# Patient Record
Sex: FEMALE | Race: WHITE | NOT HISPANIC OR LATINO | URBAN - METROPOLITAN AREA
[De-identification: names, ages, dates, MRNs, and addresses within clinical notes are randomized per-mention and may not be internally consistent; named-entity substitution may affect disease eponyms.]

---

## 2024-03-22 ENCOUNTER — INPATIENT (INPATIENT)
Facility: HOSPITAL | Age: 24
LOS: 2 days | Discharge: HOME | DRG: 392 | End: 2024-03-25
Attending: SURGERY | Admitting: SURGERY
Payer: COMMERCIAL

## 2024-03-22 VITALS
DIASTOLIC BLOOD PRESSURE: 75 MMHG | SYSTOLIC BLOOD PRESSURE: 103 MMHG | OXYGEN SATURATION: 98 % | HEART RATE: 81 BPM | TEMPERATURE: 99 F | RESPIRATION RATE: 16 BRPM | WEIGHT: 134.92 LBS

## 2024-03-22 LAB
ALBUMIN SERPL ELPH-MCNC: 4.2 G/DL — SIGNIFICANT CHANGE UP (ref 3.4–5)
ALP SERPL-CCNC: 58 U/L — SIGNIFICANT CHANGE UP (ref 40–120)
ALT FLD-CCNC: 14 U/L — SIGNIFICANT CHANGE UP (ref 12–42)
ANION GAP SERPL CALC-SCNC: 10 MMOL/L — SIGNIFICANT CHANGE UP (ref 9–16)
APPEARANCE UR: CLEAR — SIGNIFICANT CHANGE UP
AST SERPL-CCNC: 19 U/L — SIGNIFICANT CHANGE UP (ref 15–37)
BASOPHILS # BLD AUTO: 0.02 K/UL — SIGNIFICANT CHANGE UP (ref 0–0.2)
BASOPHILS NFR BLD AUTO: 0.2 % — SIGNIFICANT CHANGE UP (ref 0–2)
BILIRUB SERPL-MCNC: 0.8 MG/DL — SIGNIFICANT CHANGE UP (ref 0.2–1.2)
BILIRUB UR-MCNC: NEGATIVE — SIGNIFICANT CHANGE UP
BUN SERPL-MCNC: 9 MG/DL — SIGNIFICANT CHANGE UP (ref 7–23)
CALCIUM SERPL-MCNC: 9.7 MG/DL — SIGNIFICANT CHANGE UP (ref 8.5–10.5)
CHLORIDE SERPL-SCNC: 105 MMOL/L — SIGNIFICANT CHANGE UP (ref 96–108)
CO2 SERPL-SCNC: 26 MMOL/L — SIGNIFICANT CHANGE UP (ref 22–31)
COLOR SPEC: YELLOW — SIGNIFICANT CHANGE UP
CREAT SERPL-MCNC: 1.08 MG/DL — SIGNIFICANT CHANGE UP (ref 0.5–1.3)
DIFF PNL FLD: NEGATIVE — SIGNIFICANT CHANGE UP
EGFR: 74 ML/MIN/1.73M2 — SIGNIFICANT CHANGE UP
EOSINOPHIL # BLD AUTO: 0.01 K/UL — SIGNIFICANT CHANGE UP (ref 0–0.5)
EOSINOPHIL NFR BLD AUTO: 0.1 % — SIGNIFICANT CHANGE UP (ref 0–6)
GLUCOSE SERPL-MCNC: 89 MG/DL — SIGNIFICANT CHANGE UP (ref 70–99)
GLUCOSE UR QL: NEGATIVE MG/DL — SIGNIFICANT CHANGE UP
HCG SERPL-ACNC: <1 MIU/ML — SIGNIFICANT CHANGE UP
HCT VFR BLD CALC: 42.5 % — SIGNIFICANT CHANGE UP (ref 34.5–45)
HGB BLD-MCNC: 13.9 G/DL — SIGNIFICANT CHANGE UP (ref 11.5–15.5)
IMM GRANULOCYTES NFR BLD AUTO: 0.2 % — SIGNIFICANT CHANGE UP (ref 0–0.9)
KETONES UR-MCNC: 40 MG/DL
LACTATE BLDV-MCNC: 0.9 MMOL/L — SIGNIFICANT CHANGE UP (ref 0.5–2)
LEUKOCYTE ESTERASE UR-ACNC: NEGATIVE — SIGNIFICANT CHANGE UP
LIDOCAIN IGE QN: 28 U/L — SIGNIFICANT CHANGE UP (ref 16–77)
LYMPHOCYTES # BLD AUTO: 1.08 K/UL — SIGNIFICANT CHANGE UP (ref 1–3.3)
LYMPHOCYTES # BLD AUTO: 12 % — LOW (ref 13–44)
MCHC RBC-ENTMCNC: 29.7 PG — SIGNIFICANT CHANGE UP (ref 27–34)
MCHC RBC-ENTMCNC: 32.7 GM/DL — SIGNIFICANT CHANGE UP (ref 32–36)
MCV RBC AUTO: 90.8 FL — SIGNIFICANT CHANGE UP (ref 80–100)
MONOCYTES # BLD AUTO: 0.34 K/UL — SIGNIFICANT CHANGE UP (ref 0–0.9)
MONOCYTES NFR BLD AUTO: 3.8 % — SIGNIFICANT CHANGE UP (ref 2–14)
NEUTROPHILS # BLD AUTO: 7.52 K/UL — HIGH (ref 1.8–7.4)
NEUTROPHILS NFR BLD AUTO: 83.7 % — HIGH (ref 43–77)
NITRITE UR-MCNC: NEGATIVE — SIGNIFICANT CHANGE UP
NRBC # BLD: 0 /100 WBCS — SIGNIFICANT CHANGE UP (ref 0–0)
PH UR: 6 — SIGNIFICANT CHANGE UP (ref 5–8)
PLATELET # BLD AUTO: 201 K/UL — SIGNIFICANT CHANGE UP (ref 150–400)
POTASSIUM SERPL-MCNC: 4.1 MMOL/L — SIGNIFICANT CHANGE UP (ref 3.5–5.3)
POTASSIUM SERPL-SCNC: 4.1 MMOL/L — SIGNIFICANT CHANGE UP (ref 3.5–5.3)
PROT SERPL-MCNC: 7.6 G/DL — SIGNIFICANT CHANGE UP (ref 6.4–8.2)
PROT UR-MCNC: NEGATIVE MG/DL — SIGNIFICANT CHANGE UP
RBC # BLD: 4.68 M/UL — SIGNIFICANT CHANGE UP (ref 3.8–5.2)
RBC # FLD: 11.8 % — SIGNIFICANT CHANGE UP (ref 10.3–14.5)
SODIUM SERPL-SCNC: 141 MMOL/L — SIGNIFICANT CHANGE UP (ref 132–145)
SP GR SPEC: 1.02 — SIGNIFICANT CHANGE UP (ref 1–1.03)
TROPONIN I, HIGH SENSITIVITY RESULT: <4 NG/L — SIGNIFICANT CHANGE UP
UROBILINOGEN FLD QL: 0.2 MG/DL — SIGNIFICANT CHANGE UP (ref 0.2–1)
WBC # BLD: 8.99 K/UL — SIGNIFICANT CHANGE UP (ref 3.8–10.5)
WBC # FLD AUTO: 8.99 K/UL — SIGNIFICANT CHANGE UP (ref 3.8–10.5)

## 2024-03-22 PROCEDURE — 74177 CT ABD & PELVIS W/CONTRAST: CPT | Mod: 26,MC

## 2024-03-22 PROCEDURE — 99285 EMERGENCY DEPT VISIT HI MDM: CPT

## 2024-03-22 RX ORDER — SODIUM CHLORIDE 9 MG/ML
1000 INJECTION, SOLUTION INTRAVENOUS
Refills: 0 | Status: DISCONTINUED | OUTPATIENT
Start: 2024-03-22 | End: 2024-03-24

## 2024-03-22 RX ORDER — SODIUM CHLORIDE 9 MG/ML
1000 INJECTION INTRAMUSCULAR; INTRAVENOUS; SUBCUTANEOUS ONCE
Refills: 0 | Status: COMPLETED | OUTPATIENT
Start: 2024-03-22 | End: 2024-03-22

## 2024-03-22 RX ORDER — FAMOTIDINE 10 MG/ML
20 INJECTION INTRAVENOUS ONCE
Refills: 0 | Status: COMPLETED | OUTPATIENT
Start: 2024-03-22 | End: 2024-03-22

## 2024-03-22 RX ORDER — ONDANSETRON 8 MG/1
4 TABLET, FILM COATED ORAL ONCE
Refills: 0 | Status: COMPLETED | OUTPATIENT
Start: 2024-03-22 | End: 2024-03-22

## 2024-03-22 RX ORDER — MORPHINE SULFATE 50 MG/1
4 CAPSULE, EXTENDED RELEASE ORAL ONCE
Refills: 0 | Status: DISCONTINUED | OUTPATIENT
Start: 2024-03-22 | End: 2024-03-22

## 2024-03-22 RX ADMIN — ONDANSETRON 4 MILLIGRAM(S): 8 TABLET, FILM COATED ORAL at 13:06

## 2024-03-22 RX ADMIN — FAMOTIDINE 20 MILLIGRAM(S): 10 INJECTION INTRAVENOUS at 13:05

## 2024-03-22 RX ADMIN — MORPHINE SULFATE 4 MILLIGRAM(S): 50 CAPSULE, EXTENDED RELEASE ORAL at 13:58

## 2024-03-22 RX ADMIN — SODIUM CHLORIDE 1000 MILLILITER(S): 9 INJECTION INTRAMUSCULAR; INTRAVENOUS; SUBCUTANEOUS at 13:04

## 2024-03-22 RX ADMIN — Medication 30 MILLILITER(S): at 13:05

## 2024-03-22 RX ADMIN — SODIUM CHLORIDE 100 MILLILITER(S): 9 INJECTION, SOLUTION INTRAVENOUS at 21:01

## 2024-03-22 NOTE — H&P ADULT - NSHPLABSRESULTS_GEN_ALL_CORE
CBC Full  -  ( 22 Mar 2024 12:32 )  WBC Count : 8.99 K/uL  RBC Count : 4.68 M/uL  Hemoglobin : 13.9 g/dL  Hematocrit : 42.5 %  Platelet Count - Automated : 201 K/uL  Mean Cell Volume : 90.8 fl  Mean Cell Hemoglobin : 29.7 pg  Mean Cell Hemoglobin Concentration : 32.7 gm/dL  Auto Neutrophil # : 7.52 K/uL  Auto Lymphocyte # : 1.08 K/uL  Auto Monocyte # : 0.34 K/uL  Auto Eosinophil # : 0.01 K/uL  Auto Basophil # : 0.02 K/uL  Auto Neutrophil % : 83.7 %  Auto Lymphocyte % : 12.0 %  Auto Monocyte % : 3.8 %  Auto Eosinophil % : 0.1 %  Auto Basophil % : 0.2 %      141  |  105  |  9   ----------------------------<  89  4.1   |  26  |  1.08  Ca    9.7      22 Mar 2024 12:32  TPro  7.6  /  Alb  4.2  /  TBili  0.8  /  DBili  x   /  AST  19  /  ALT  14  /  AlkPhos  58  03-22  LIVER FUNCTIONS - ( 22 Mar 2024 12:32 )  Alb: 4.2 g/dL / Pro: 7.6 g/dL / ALK PHOS: 58 U/L / ALT: 14 U/L / AST: 19 U/L / GGT: x         CAPILLARY BLOOD GLUCOSE    Urinalysis Basic - ( 22 Mar 2024 12:32 )  Color: Yellow / Appearance: Clear / S.024 / pH: x  Gluc: 89 mg/dL / Ketone: 40 mg/dL  / Bili: Negative / Urobili: 0.2 mg/dL   Blood: x / Protein: Negative mg/dL / Nitrite: Negative   Leuk Esterase: Negative / RBC: x / WBC x   Sq Epi: x / Non Sq Epi: x / Bacteria: x    PROCEDURE:  CT of the Abdomen and Pelvis was performed.  Sagittal and coronal reformats were performed.  FINDINGS:  LOWER CHEST: Within normal limits.  LIVER: Within normal limits.  BILE DUCTS: Normal caliber.  GALLBLADDER: Within normal limits.  SPLEEN: Within normal limits.  PANCREAS: Within normal limits.  ADRENALS: Within normal limits.  KIDNEYS/URETERS: Within normal limits.  BLADDER: Within normal limits.  REPRODUCTIVE ORGANS: Normal size anteverted uterus containing an IUD in   relation to the endometrial canal of fundus and body. Unremarkable right   ovary. 2.6 x 3.4 cm left ovarian cyst/dominant follicle.  BOWEL: Evaluation of bowel is limited due to lack of oral contrast   material. Colon loaded with fecal material. Colon is not distended.   Normal appendix. Stomach is not distended. Moderately dilated loops of   mid to distal ileum with diameters measuring up to 4.3 cm. The distal   ileal loops are fecalized . There appear to be discontinuous segments of   collapsed distal small bowel present. There is a 14 cm long segment of   mild wall thickening in the midpelvis. One single transition point is not   definitively identified. Normal enhancement of the bowel wall. No   pneumatosis. No mesenteric or portal venous air. No mesenteric edema.  PERITONEUM: Trace ascites.  VESSELS: Within normal limits.  RETROPERITONEUM/LYMPH NODES: No lymphadenopathy.  ABDOMINAL WALL: Within normal limits.  BONES: Within normal limits.    IMPRESSION:  Normal appendix.  Dilated distal small bowel with fecalization, suggestive of small bowel   obstruction.. 1 single transition point cannot be identified. Consider   chronic inflammatory ileitis/Crohn's or other causes of ileal stenosis in   the differential diagnosis.  Trace ascites.    --- End of Report ---

## 2024-03-22 NOTE — ED PROVIDER NOTE - PHYSICAL EXAMINATION
VITAL SIGNS: I have reviewed nursing notes and confirm.  CONSTITUTIONAL: Well-developed; well-nourished; in no acute distress.  SKIN: Skin is warm and dry, no acute rash.  HEAD: Normocephalic; atraumatic.  NECK: Supple; non tender.  CARD: S1, S2 normal; no murmurs, gallops, or rubs. Regular rate and rhythm.  RESP: No wheezes, rales or rhonchi.  ABD: Soft; +ttp along the epogastric and LUE region; mild periumbilical ttp; no rebound or guarding.  EXT: Normal ROM. No clubbing, cyanosis or edema.  NEURO: Alert, oriented. Grossly unremarkable. [place], normal tone, no gross motor or sensory changes. Fluent speech.   PSYCH: Cooperative, appropriate. Mood and affect wnl. VITAL SIGNS: I have reviewed nursing notes and confirm.  CONSTITUTIONAL: Well-developed; well-nourished; in no acute distress.  SKIN: Skin is warm and dry, no acute rash.  HEAD: Normocephalic; atraumatic.  NECK: Supple; non tender.  CARD: S1, S2 normal; no murmurs, gallops, or rubs. Regular rate and rhythm.  RESP: No wheezes, rales or rhonchi.  ABD: Soft; +ttp along the epigastric and LUE region; mild periumbilical ttp; no rebound or guarding.  EXT: Normal ROM. No clubbing, cyanosis or edema.  NEURO: Alert, oriented. Grossly unremarkable. [place], normal tone, no gross motor or sensory changes. Fluent speech.   PSYCH: Cooperative, appropriate. Mood and affect wnl.

## 2024-03-22 NOTE — PATIENT PROFILE ADULT - FALL HARM RISK - UNIVERSAL INTERVENTIONS
Six month check up for history of endometrial cancer. Pt states no abnormal spotting, bleeding or pain. 1. Have you been to the ER, urgent care clinic since your last visit? Hospitalized since your last visit?no  2. Have you seen or consulted any other health care providers outside of the [address] [place] [address] since your last visit? Include any pap smears or colon screening.  no Bed in lowest position, wheels locked, appropriate side rails in place/Call bell, personal items and telephone in reach/Instruct patient to call for assistance before getting out of bed or chair/Non-slip footwear when patient is out of bed/[place] to call system/Physically safe environment - no spills, clutter or unnecessary equipment/Purposeful Proactive Rounding/Room/bathroom lighting operational, light cord in reach

## 2024-03-22 NOTE — ED PROVIDER NOTE - NS ED ROS FT
+abdominal pain, nausea  Denies fevers, chills, vomiting, diarrhea, constipation, urinary symptoms, chest pain, palpitations, shortness of breath, dyspnea on exertion, syncope/near syncope, cough/URI symptoms, headache, weakness, numbness, focal deficits, visual changes, gait or balance changes, dizziness

## 2024-03-22 NOTE — CHART NOTE - NSCHARTNOTEFT_GEN_A_CORE
22yo F, PMx iatrogenic esophagitis 2/2 doxycycline, insig PSx who presented to ER w intermittent episodes epigastric abd pain since early AM. Endorses nausea w/o emesis, ROS otherwise negative. +BF. Afebrile, HD w/nl. Abd soft, ND, TTP in epigastrium w/o R/G. WBC 8.99 w PMN left shift, otherwise unremarkable. CT A/P w IV contrast notable for dilated loops of fecalized small bowel w discontinuous segments of collapsed small bowel, w 14cm segment of ileal thickening in midpelvis, extensive stool burden in colon. DDx broad, gastroenteritis vs IBD (Crohns>UC), mechanical obstruction less likely given normal BF and loaded colon. Will admit, NPO, JUSTIN, observe off abx and anti-emetics. GI consult in AM. Will likely need colonoscopy to evaluate for Crohns. Discussed w attending surgeon on call.

## 2024-03-22 NOTE — ED ADULT NURSE REASSESSMENT NOTE - NS ED NURSE REASSESS COMMENT FT1
Patients reports relief from pain, currently resting comfortably on recliner. Regular respiratory rate and rhythm noted.

## 2024-03-22 NOTE — PATIENT PROFILE ADULT - TOBACCO USE
Future appt:    Last Appointment with provider:   7/29/2020; No f/u recommended    Last appointment at [facility]:  7/29/2020  Cholesterol, Total (mg/dL)   Date Value   03/19/2019 207 (H)     HDL Cholesterol (mg/dL)   Date Value   03/19/2019 40     LDL C
Never smoker

## 2024-03-22 NOTE — H&P ADULT - HISTORY OF PRESENT ILLNESS
23y F with PMHx of esophagitis (diagnosed on EGD in 2020) and episodic bloody BMs (no medical workup) and no PSHx presented to [facility] with 1d abdominal pain and nausea. Patient's last flatus was yesterday and last BM this morning. Patient describes the abdominal pain as episodic, sharp, and wave like across the upper abdomen. On presentation, patient was afebrile and hemodynamically stable. CBC, CMP, and UA unremarkable. CT A/P with findings of dilated distal small bowel with fecalization, suggestive of small bowel obstruction (with no noticable transition point) vs chronic inflammatory ileitis/Crohn's or other causes of ileal stenosis. Patient was admitted to [facility] surgery for further workup of possible SBO vs IBD.     PMHx: esophagitis  PSHx: None  Allergies: NKDA  Meds: none  Social: occasional alcohol and marijuana use  FHx: Breast cancer (mother), Hodgkins lymphoms (father)  Last C-scope: Denies  Last EGD: 2020 (esophagitis)

## 2024-03-22 NOTE — ED ADULT NURSE NOTE - EXTENSIONS OF SELF_ADULT
None Valtrex Pregnancy And Lactation Text: this medication is Pregnancy Category B and is considered safe during pregnancy. This medication is not directly found in breast milk but it's metabolite acyclovir is present.

## 2024-03-22 NOTE — ED ADULT NURSE NOTE - OBJECTIVE STATEMENT
Patient reports sharp upper ab pain that woke her at 4am. Reports nausea. Reports regular BM, but not passing gas. Denied diarrhea, vomiting, fevers,chills.

## 2024-03-22 NOTE — ED ADULT NURSE NOTE - NSFALLUNIVINTERV_ED_ALL_ED
Bed/Stretcher in lowest position, wheels locked, appropriate side rails in place/Call bell, personal items and telephone in reach/Instruct patient to call for assistance before getting out of bed/chair/stretcher/Non-slip footwear applied when patient is off stretcher/[place] to call system/Physically safe environment - no spills, clutter or unnecessary equipment/Purposeful proactive rounding/Room/bathroom lighting operational, light cord in reach

## 2024-03-22 NOTE — ED PROVIDER NOTE - PROGRESS NOTE DETAILS
Patient states her pain has not resolved or improved with a GI cocktail.  Will plan to add morphine for pain relief and will obtain CT abdomen and pelvis to evaluate for any pathology.  Patient is stable at this time.

## 2024-03-22 NOTE — H&P ADULT - ASSESSMENT
23y F with PMHx of esophagitis (diagnosed on EGD in 2020) and episodic bloody BMs (no medical workup) and no PSHx presented to [facility] with 1d abdominal pain and nausea. CT A/P findings concerning for SBO vs inflammatory ileitis vs Crohn's. Clinical exam and continued bowel function makes SBO less likely diagnosis. Patient admitted to [facility] fur further workup.    NPO/IVF  Pain and nausea prn  SCDs/OOB  AM labs  GI consult

## 2024-03-22 NOTE — ED PROVIDER NOTE - OBJECTIVE STATEMENT
23-year-old female, denies past medical history, presenting to the emergency department complaining of upper abdominal pain since 4 AM this morning.  Patient describes waves of pain that have been increasing through the day.  She reports taking a Tylenol with some mild relief, but then states the pain returned.  She also tried taking 2 Tums without improvement.  Patient reports the last meal she had was dinner around 7 PM; which consisted of 3 potatoes and carrots.  She denies vomiting, headache, back pain, chest pain or shortness of breath.  Patient does endorse mild nausea.  She states the pain radiates towards above her umbilicus, left lower quadrant, as well as towards her sternum.

## 2024-03-22 NOTE — ED PROVIDER NOTE - CLINICAL SUMMARY MEDICAL DECISION MAKING FREE TEXT BOX
23-year-old female, denies past medical history, presenting to the emergency department complaining of upper abdominal pain since 4 AM this morning.  Patient found to have tenderness to palpation in the epigastric region.  No other red flags noted on exam and patient has stable vitals in triage.  Plan to give GI cocktail and obtain medical labs.  Will also give IV fluids.  Will reassess and dispo pending medical workup and clinical improvement. 23-year-old female, denies past medical history, presenting to the emergency department complaining of upper abdominal pain since 4 AM this morning.  Patient found to have tenderness to palpation in the epigastric region and periumbilical region.  No other red flags noted on exam and patient has stable vitals in triage.  Plan to give GI cocktail and obtain medical labs.  Will also give IV fluids.  Will reassess and dispo pending medical workup and clinical improvement.

## 2024-03-22 NOTE — H&P ADULT - NSICDXFAMILYHX_GEN_ALL_CORE_FT
FAMILY HISTORY:  Father  Still living? Unknown  Family history of Hodgkin's lymphoma, Age at diagnosis: Age Unknown    Mother  Still living? Unknown  FH: breast cancer, Age at diagnosis: Age Unknown

## 2024-03-23 LAB
ANION GAP SERPL CALC-SCNC: 10 MMOL/L — SIGNIFICANT CHANGE UP (ref 5–17)
APTT BLD: 30.4 SEC — SIGNIFICANT CHANGE UP (ref 24.5–35.6)
BASOPHILS # BLD AUTO: 0.02 K/UL — SIGNIFICANT CHANGE UP (ref 0–0.2)
BASOPHILS NFR BLD AUTO: 0.3 % — SIGNIFICANT CHANGE UP (ref 0–2)
BLD GP AB SCN SERPL QL: NEGATIVE — SIGNIFICANT CHANGE UP
BUN SERPL-MCNC: 12 MG/DL — SIGNIFICANT CHANGE UP (ref 7–23)
CALCIUM SERPL-MCNC: 9 MG/DL — SIGNIFICANT CHANGE UP (ref 8.4–10.5)
CHLORIDE SERPL-SCNC: 106 MMOL/L — SIGNIFICANT CHANGE UP (ref 96–108)
CO2 SERPL-SCNC: 22 MMOL/L — SIGNIFICANT CHANGE UP (ref 22–31)
CREAT SERPL-MCNC: 0.88 MG/DL — SIGNIFICANT CHANGE UP (ref 0.5–1.3)
CRP SERPL-MCNC: 5.7 MG/L — HIGH (ref 0–4)
CULTURE RESULTS: SIGNIFICANT CHANGE UP
EGFR: 95 ML/MIN/1.73M2 — SIGNIFICANT CHANGE UP
EOSINOPHIL # BLD AUTO: 0.11 K/UL — SIGNIFICANT CHANGE UP (ref 0–0.5)
EOSINOPHIL NFR BLD AUTO: 1.8 % — SIGNIFICANT CHANGE UP (ref 0–6)
ERYTHROCYTE [SEDIMENTATION RATE] IN BLOOD: 5 MM/HR — SIGNIFICANT CHANGE UP
GI PCR PANEL: SIGNIFICANT CHANGE UP
GLUCOSE SERPL-MCNC: 60 MG/DL — LOW (ref 70–99)
HCT VFR BLD CALC: 35.4 % — SIGNIFICANT CHANGE UP (ref 34.5–45)
HGB BLD-MCNC: 11.6 G/DL — SIGNIFICANT CHANGE UP (ref 11.5–15.5)
IMM GRANULOCYTES NFR BLD AUTO: 0.2 % — SIGNIFICANT CHANGE UP (ref 0–0.9)
INR BLD: 1.02 — SIGNIFICANT CHANGE UP (ref 0.85–1.18)
LYMPHOCYTES # BLD AUTO: 1.6 K/UL — SIGNIFICANT CHANGE UP (ref 1–3.3)
LYMPHOCYTES # BLD AUTO: 25.9 % — SIGNIFICANT CHANGE UP (ref 13–44)
MAGNESIUM SERPL-MCNC: 1.9 MG/DL — SIGNIFICANT CHANGE UP (ref 1.6–2.6)
MCHC RBC-ENTMCNC: 31 PG — SIGNIFICANT CHANGE UP (ref 27–34)
MCHC RBC-ENTMCNC: 32.8 GM/DL — SIGNIFICANT CHANGE UP (ref 32–36)
MCV RBC AUTO: 94.7 FL — SIGNIFICANT CHANGE UP (ref 80–100)
MONOCYTES # BLD AUTO: 0.37 K/UL — SIGNIFICANT CHANGE UP (ref 0–0.9)
MONOCYTES NFR BLD AUTO: 6 % — SIGNIFICANT CHANGE UP (ref 2–14)
NEUTROPHILS # BLD AUTO: 4.06 K/UL — SIGNIFICANT CHANGE UP (ref 1.8–7.4)
NEUTROPHILS NFR BLD AUTO: 65.8 % — SIGNIFICANT CHANGE UP (ref 43–77)
NRBC # BLD: 0 /100 WBCS — SIGNIFICANT CHANGE UP (ref 0–0)
PHOSPHATE SERPL-MCNC: 3.6 MG/DL — SIGNIFICANT CHANGE UP (ref 2.5–4.5)
PLATELET # BLD AUTO: 143 K/UL — LOW (ref 150–400)
POTASSIUM SERPL-MCNC: 4.4 MMOL/L — SIGNIFICANT CHANGE UP (ref 3.5–5.3)
POTASSIUM SERPL-SCNC: 4.4 MMOL/L — SIGNIFICANT CHANGE UP (ref 3.5–5.3)
PROTHROM AB SERPL-ACNC: 11.6 SEC — SIGNIFICANT CHANGE UP (ref 9.5–13)
RBC # BLD: 3.74 M/UL — LOW (ref 3.8–5.2)
RBC # FLD: 11.8 % — SIGNIFICANT CHANGE UP (ref 10.3–14.5)
RH IG SCN BLD-IMP: POSITIVE — SIGNIFICANT CHANGE UP
SODIUM SERPL-SCNC: 138 MMOL/L — SIGNIFICANT CHANGE UP (ref 135–145)
SPECIMEN SOURCE: SIGNIFICANT CHANGE UP
WBC # BLD: 6.17 K/UL — SIGNIFICANT CHANGE UP (ref 3.8–10.5)
WBC # FLD AUTO: 6.17 K/UL — SIGNIFICANT CHANGE UP (ref 3.8–10.5)

## 2024-03-23 PROCEDURE — 99254 IP/OBS CNSLTJ NEW/EST MOD 60: CPT | Mod: GC

## 2024-03-23 PROCEDURE — 99254 IP/OBS CNSLTJ NEW/EST MOD 60: CPT

## 2024-03-23 RX ORDER — ACETAMINOPHEN 500 MG
1000 TABLET ORAL ONCE
Refills: 0 | Status: COMPLETED | OUTPATIENT
Start: 2024-03-23 | End: 2024-03-23

## 2024-03-23 RX ORDER — ONDANSETRON 8 MG/1
4 TABLET, FILM COATED ORAL ONCE
Refills: 0 | Status: COMPLETED | OUTPATIENT
Start: 2024-03-23 | End: 2024-03-23

## 2024-03-23 RX ORDER — MAGNESIUM SULFATE 500 MG/ML
1 VIAL (ML) INJECTION ONCE
Refills: 0 | Status: COMPLETED | OUTPATIENT
Start: 2024-03-23 | End: 2024-03-23

## 2024-03-23 RX ORDER — SOD SULF/SODIUM/NAHCO3/KCL/PEG
2000 SOLUTION, RECONSTITUTED, ORAL ORAL ONCE
Refills: 0 | Status: COMPLETED | OUTPATIENT
Start: 2024-03-23 | End: 2024-03-23

## 2024-03-23 RX ORDER — ACETAMINOPHEN 500 MG
650 TABLET ORAL EVERY 6 HOURS
Refills: 0 | Status: DISCONTINUED | OUTPATIENT
Start: 2024-03-23 | End: 2024-03-24

## 2024-03-23 RX ORDER — LANOLIN ALCOHOL/MO/W.PET/CERES
5 CREAM (GRAM) TOPICAL AT BEDTIME
Refills: 0 | Status: DISCONTINUED | OUTPATIENT
Start: 2024-03-23 | End: 2024-03-25

## 2024-03-23 RX ORDER — ONDANSETRON 8 MG/1
4 TABLET, FILM COATED ORAL EVERY 4 HOURS
Refills: 0 | Status: DISCONTINUED | OUTPATIENT
Start: 2024-03-23 | End: 2024-03-25

## 2024-03-23 RX ORDER — ONDANSETRON 8 MG/1
4 TABLET, FILM COATED ORAL EVERY 4 HOURS
Refills: 0 | Status: DISCONTINUED | OUTPATIENT
Start: 2024-03-23 | End: 2024-03-23

## 2024-03-23 RX ADMIN — Medication 400 MILLIGRAM(S): at 14:23

## 2024-03-23 RX ADMIN — Medication 650 MILLIGRAM(S): at 21:15

## 2024-03-23 RX ADMIN — Medication 2000 MILLILITER(S): at 17:35

## 2024-03-23 RX ADMIN — Medication 1000 MILLIGRAM(S): at 07:34

## 2024-03-23 RX ADMIN — Medication 400 MILLIGRAM(S): at 07:04

## 2024-03-23 RX ADMIN — ONDANSETRON 4 MILLIGRAM(S): 8 TABLET, FILM COATED ORAL at 07:55

## 2024-03-23 RX ADMIN — Medication 650 MILLIGRAM(S): at 20:52

## 2024-03-23 RX ADMIN — Medication 100 GRAM(S): at 12:17

## 2024-03-23 RX ADMIN — SODIUM CHLORIDE 100 MILLILITER(S): 9 INJECTION, SOLUTION INTRAVENOUS at 12:17

## 2024-03-23 NOTE — CONSULT NOTE ADULT - ASSESSMENT
23y F with PMHx of esophagitis (diagnosed on EGD in 2020) and episodic bloody BMs (no medical workup) and no PSHx presented to [facility] with 1d abdominal pain and nausea in the setting of  small bowel obstruction vs chronic inflammatory ileitis/Crohn's or other causes of ileal stenosis. Patient was admitted to [facility] surgery for further workup of possible SBO vs IBD.     Plan  #r/o SBO  -ab pain significantly improved  -GI consulted, possible c-scope Monday  -f/u GI pcr, fecal calprotectin, HBV, quantiferon  -CLD, advance as tolerated  -mgmt as per primary    #Thrombocytopenia  -no h/o bleeding d/o; likely dilutional  -c/w CBC qd    #DVT ppx  -SCDs

## 2024-03-23 NOTE — CONSULT NOTE ADULT - SUBJECTIVE AND OBJECTIVE BOX
Patient seen and evaluated at bedside    HPI:  23y F with PMHx of esophagitis (diagnosed on EGD in 2020) and episodic bloody BMs (no medical workup) and no PSHx presented to [facility] with 1d abdominal pain and nausea in the setting of  small bowel obstruction vs chronic inflammatory ileitis/Crohn's or other causes of ileal stenosis. Patient was admitted to [facility] surgery for further workup of possible SBO vs IBD.     PMHx:   History of esophagitis      PSHx:   No significant past surgical history      FAMILY HISTORY:  Family history of Hodgkin's lymphoma (Father)    FH: breast cancer (Mother)      Allergies:  penicillins (Unknown)  clindamycin (Hives; Urticaria)  sulfa drugs (Unknown)    Home Medications:    Current Medications:   lactated ringers. 1000 milliLiter(s) IV Continuous <Continuous>  polyethylene glycol/electrolyte Solution. 2000 milliLiter(s) Oral once    Social History  Smoking History: denies  Alcohol Use: occasional  Drug Use: occasional (marijuana)    REVIEW OF SYSTEMS:  Constitutional:     [X] negative [ ] fevers [ ] chills [ ] weight loss [ ] weight gain  HEENT:                  [X] negative [ ] dry eyes [ ] eye irritation [ ] postnasal drip [ ] nasal congestion  CV:                         [X] negative  [ ] chest pain [ ] orthopnea [ ] palpitations [ ] murmur  Resp:                     [X] negative [ ] cough [ ] shortness of breath [ ] wheezing [ ] sputum [ ] hemoptysis  GI:                          [X] negative [ ] nausea [ ] vomiting [ ] diarrhea [ ] constipation [ ] abd pain [ ] dysphagia   :                        [X] negative [ ] dysuria [ ] nocturia [ ] hematuria [ ] increased urinary frequency  MSK:                      [X] negative [ ] back pain [ ] myalgias [ ] arthralgias [ ] fracture  Skin:                       [X] negative [ ] rash [ ] itch  Neuro:                   [X] negative [ ] headache [ ] dizziness [ ] syncope [ ] weakness [ ] numbness  Psych:                    [X] negative [ ] anxiety [ ] depression  Endo:                     [X] negative [ ] diabetes [ ] thyroid problem  Heme/Lymph:      [X] negative [ ] anemia [ ] bleeding problem  Allergic/Immune: [X] negative [ ] itchy eyes [ ] nasal discharge [ ] hives [ ] angioedema    [X] All other systems negative or otherwise described above.  [ ] Unable to assess ROS due to ________.    ICU Vital Signs Last 24 Hrs  T(C): 36.8 (23 Mar 2024 13:24), Max: 37 (22 Mar 2024 17:23)  T(F): 98.3 (23 Mar 2024 13:24), Max: 98.6 (22 Mar 2024 17:23)  HR: 80 (23 Mar 2024 13:24) (67 - 83)  BP: 109/76 (23 Mar 2024 13:24) (95/58 - 109/76)  BP(mean): 71 (23 Mar 2024 09:34) (71 - 71)  ABP: --  ABP(mean): --  RR: 18 (23 Mar 2024 13:24) (16 - 18)  SpO2: 98% (23 Mar 2024 13:24) (98% - 100%)    O2 Parameters below as of 23 Mar 2024 13:24  Patient On (Oxygen Delivery Method): room air          Orthostatic VS    Daily     Daily   I&O's Summary    22 Mar 2024 07:01  -  23 Mar 2024 07:00  --------------------------------------------------------  IN: 500 mL / OUT: 300 mL / NET: 200 mL    23 Mar 2024 07:01  -  23 Mar 2024 14:40  --------------------------------------------------------  IN: 300 mL / OUT: 600 mL / NET: -300 mL        Physical Exam:  GENERAL: No acute distress, well-developed  ENT: EOMI, conjunctiva and sclera clear, Neck supple, No JVD, moist mucosa  CHEST/LUNG: Clear to auscultation bilaterally; No wheeze, equal breath sounds bilaterally  HEART: Regular rate and rhythm; No murmurs, rubs, or gallops, radial and DP 2+ b/l, euvolemic  ABDOMEN: Soft, Nontender, Nondistended  EXTREMITIES:  No clubbing, cyanosis, or edema  NEUROLOGY: AAOx3, non-focal  SKIN: Normal color, No rashes or lesions    LABS:                        11.6   6.17  )-----------( 143      ( 23 Mar 2024 05:30 )             35.4     PT/INR - ( 23 Mar 2024 05:30 )   PT: 11.6 sec;   INR: 1.02          PTT - ( 23 Mar 2024 05:30 )  PTT:30.4 sec  03-23    138  |  106  |  12  ----------------------------<  60<L>  4.4   |  22  |  0.88    Ca    9.0      23 Mar 2024 05:30  Phos  3.6     03-23  Mg     1.9     03-23    TPro  7.6  /  Alb  4.2  /  TBili  0.8  /  DBili  x   /  AST  19  /  ALT  14  /  AlkPhos  58  03-22      Urinalysis Basic - ( 23 Mar 2024 05:30 )    Color: x / Appearance: x / SG: x / pH: x  Gluc: 60 mg/dL / Ketone: x  / Bili: x / Urobili: x   Blood: x / Protein: x / Nitrite: x   Leuk Esterase: x / RBC: x / WBC x   Sq Epi: x / Non Sq Epi: x / Bacteria: x          RADIOLOGY & ADDITIONAL STUDIES:    Reviewed
HPI:  23F h/o pill esophagitis (2020) p/w abd pain and nausea x 1d, found with ?TI stricture.    Pt was in her USOH until being woken up with episodic band-like epigastric abd pain yesterday AM which lasted ~10 hrs until she received pain meds in ED. At baseline pt reports daily non-bloody formed BM and had a normal-appearing BM yesterday at 10AM without improvement in pain. +Nausea but no fevers, chills, vomiting, vision changes, oral ulcers, rashes, joint pains, recent GI infections, or FH of IBD/autoimmune conditions. In the past had GI issues x 1 yr with Accutane (resolved in 2022), lower abd cramping thought 2/2 ovarian cyst, and previously had minimal BRBPR on toilet paper while wiping. No prior colonoscopy.    At [facility], labs notable for  and CRP 5.7, and CT A/P shows ileal dilation and fecalization w/o transition point, no significant stranding on CT, and stool in R colon. Currently she continues to feel overall improved and +passing gas.    Allergies  penicillins (Unknown)  clindamycin (Hives; Urticaria)  sulfa drugs (Unknown)    Intolerances      Home Medications:    MEDICATIONS:  MEDICATIONS  (STANDING):  lactated ringers. 1000 milliLiter(s) (100 mL/Hr) IV Continuous <Continuous>  magnesium sulfate  IVPB 1 Gram(s) IV Intermittent once    MEDICATIONS  (PRN):    PAST MEDICAL & SURGICAL HISTORY:  History of esophagitis      No significant past surgical history      Vital Signs Last 24 Hrs  T(C): 36.8 (23 Mar 2024 04:36), Max: 37 (22 Mar 2024 12:06)  T(F): 98.2 (23 Mar 2024 04:36), Max: 98.6 (22 Mar 2024 12:06)  HR: 83 (23 Mar 2024 04:36) (67 - 83)  BP: 105/72 (23 Mar 2024 04:36) (101/65 - 108/69)  BP(mean): --  RR: 18 (23 Mar 2024 04:36) (16 - 18)  SpO2: 100% (23 Mar 2024 04:36) (98% - 100%)    Parameters below as of 23 Mar 2024 04:36  Patient On (Oxygen Delivery Method): room air      03-22 @ 07:01  -  03-23 @ 07:00  --------------------------------------------------------  IN: 500 mL / OUT: 300 mL / NET: 200 mL      PHYSICAL EXAM:  General: Alert, no acute distress  Lungs: Normal respiratory effort  Abdomen: Soft, nondistended, mild tenderness midline epigastrically and periumbilically  Extremities: No edema  Neurological: Moving all extremities spontaneously    LABS:                        11.6   6.17  )-----------( 143      ( 23 Mar 2024 05:30 )             35.4     03-23    138  |  106  |  12  ----------------------------<  60<L>  4.4   |  22  |  0.88    Ca    9.0      23 Mar 2024 05:30  Phos  3.6     03-23  Mg     1.9     03-23    TPro  7.6  /  Alb  4.2  /  TBili  0.8  /  DBili  x   /  AST  19  /  ALT  14  /  AlkPhos  58  03-22    PT/INR - ( 23 Mar 2024 05:30 )   PT: 11.6 sec;   INR: 1.02     PTT - ( 23 Mar 2024 05:30 )  PTT:30.4 sec    RADIOLOGY & ADDITIONAL STUDIES:  Reviewed.

## 2024-03-23 NOTE — CONSULT NOTE ADULT - ASSESSMENT
23F h/o pill esophagitis (2020) p/w abd pain and nausea x 1d, found with ?TI stricture.    Unclear cause CT findings -- strictured TI from Crohns is possible but history not suggestive of prior IBD and no FH of autoimmune conditions. No significant evidence of inflammation.    Recommendations:  - F/u GI PCR, fecal calprotectin  - Check HBV, Quantiferon  - Advance diet to clear liquids, and if tolerating would start slow bowel prep with Golytely today for tentative colonoscopy Monday  - If pt sx resolve and prefers outpatient colo, pt should be seen at [address] [place] [address], [address], [place] [address] (phone: [phone])    We will continue to follow. Please see attending addendum for final recommendations.     Kenyon (Elizabeth Bah MD  Gastroenterology Fellow  Pager (M-F 7a-7z): [phone]  Pager (after hours): Please call  for on-call fellow   23F h/o pill esophagitis (2020) p/w abd pain and nausea x 1d, found with ?TI stricture.    Unclear cause CT findings -- strictured TI from Crohns is possible but history not suggestive of prior IBD and no FH of autoimmune conditions. No significant evidence of inflammation. Discussed with patient regarding colonoscopy for evaluation and she prefers to get outpatient evaluation. If she is tolerating her diet this would be OK from GI standpoint. She will talk to her father (who is a physician) regarding which GI to see.    Recommendations:  - F/u GI PCR, fecal calprotectin  - Check HBV, Quantiferon  - Advance diet to clear liquids, then to low-residue diet.   - Pt can be booked for direct colonoscopy if she would like to follow up with [facility] -- please let us know and we can make arrangements  - If pt decides on inpatient colonoscopy, would keep on clears and start slow bowel prep with Gol[facility] today for tentative colonoscopy Monday    We will continue to follow. Please see attending addendum for final recommendations.     Kenyon (Lexi) MD [place]  Gastroenterology Fellow  Pager (M-F 7a-5p): [phone]  Pager (after hours): Please call  for on-call fellow

## 2024-03-23 NOTE — CONSULT NOTE ADULT - ATTENDING COMMENTS
Patient seen and discussed with fellow plan as noted above  Presented with abdominal pain, found to have likely inflammatory TI stricture on cross sectional imaging. Needs colonoscopy (can be outpatient) as well as MRE, low residue diet.

## 2024-03-23 NOTE — PROGRESS NOTE ADULT - SUBJECTIVE AND OBJECTIVE BOX
O/N: admitted, GI consulted O/N: admitted, GI consulted    SUBJECTIVE: Patient seen and examined bedside by Surgical resident. States that the pain is ok this am, better than when she showed up, +n/-v controlled with zofran, -f/bms since her arrival, denies any other complains at this time      MEDICATIONS  (PRN):      I&O's Detail    22 Mar 2024 07:01  -  23 Mar 2024 07:00  --------------------------------------------------------  IN:    Lactated Ringers: 500 mL  Total IN: 500 mL    OUT:    Voided (mL): 300 mL  Total OUT: 300 mL    Total NET: 200 mL          Vital Signs Last 24 Hrs  T(C): 36.8 (23 Mar 2024 04:36), Max: 37 (22 Mar 2024 12:06)  T(F): 98.2 (23 Mar 2024 04:36), Max: 98.6 (22 Mar 2024 12:06)  HR: 83 (23 Mar 2024 04:36) (67 - 83)  BP: 105/72 (23 Mar 2024 04:36) (101/65 - 108/69)  BP(mean): --  RR: 18 (23 Mar 2024 04:36) (16 - 18)  SpO2: 100% (23 Mar 2024 04:36) (98% - 100%)    Parameters below as of 23 Mar 2024 04:36  Patient On (Oxygen Delivery Method): room air        General: NAD, resting comfortably in bed  C/V: NSR  Pulm: Nonlabored breathing, no respiratory distress  Abd: soft, epigastric TTP, mild distension   Extrem: WWP, no edema, SCDs in place    LABS:                        11.6   6.17  )-----------( 143      ( 23 Mar 2024 05:30 )             35.4     03-23    138  |  106  |  12  ----------------------------<  60<L>  4.4   |  22  |  0.88    Ca    9.0      23 Mar 2024 05:30  Phos  3.6     03-23  Mg     1.9     03-23    TPro  7.6  /  Alb  4.2  /  TBili  0.8  /  DBili  x   /  AST  19  /  ALT  14  /  AlkPhos  58  03-22    PT/INR - ( 23 Mar 2024 05:30 )   PT: 11.6 sec;   INR: 1.02          PTT - ( 23 Mar 2024 05:30 )  PTT:30.4 sec  Urinalysis Basic - ( 23 Mar 2024 05:30 )    Color: x / Appearance: x / SG: x / pH: x  Gluc: 60 mg/dL / Ketone: x  / Bili: x / Urobili: x   Blood: x / Protein: x / Nitrite: x   Leuk Esterase: x / RBC: x / WBC x   Sq Epi: x / Non Sq Epi: x / Bacteria: x        RADIOLOGY & ADDITIONAL STUDIES:  CT Abdomen and Pelvis w/ IV Cont:   ACC: [id number] EXAM:  CT ABDOMEN AND PELVIS IC   ORDERED BY: RUBÉN BROWN     PROCEDURE DATE:  03/22/2024          INTERPRETATION:  CLINICAL INFORMATION: 23-year-old female. Abdominal pain.    COMPARISON: None.    CONTRAST/COMPLICATIONS:  IV Contrast: Omnipaque 350  96 cc administered   4 cc discarded  Oral Contrast: NONE  Complications: None reported at time of study completion    PROCEDURE:  CT of the Abdomen and Pelvis was performed.  Sagittal and coronal reformats were performed.    FINDINGS:  LOWER CHEST: Within normal limits.    LIVER: Within normal limits.  BILE DUCTS: Normal caliber.  GALLBLADDER: Within normal limits.  SPLEEN: Within normal limits.  PANCREAS: Within normal limits.  ADRENALS: Within normal limits.  KIDNEYS/URETERS: Within normal limits.    BLADDER: Within normal limits.  REPRODUCTIVE ORGANS: Normal size anteverted uterus containing an IUD in   relation to the endometrial canal of fundus and body. Unremarkable right   ovary. 2.6 x 3.4 cm left ovarian cyst/dominant follicle.    BOWEL: Evaluation of bowel is limited due to lack of oral contrast   material. Colon loaded with fecal material. Colon is not distended.   Normal appendix. Stomach is not distended. Moderately dilated loops of   mid to distal ileumwith diameters measuring up to 4.3 cm. The distal   ileal loops are fecalized . There appear to be discontinuous segments of   collapsed distal small bowel present. There is a 14 cm long segment of   mild wall thickening in the midpelvis. One single transition point is not   definitively identified. Normal enhancement of the bowel wall. No   pneumatosis. No mesenteric or portal venous air. No mesenteric edema.  PERITONEUM: Trace ascites.  VESSELS: Within normal limits.  RETROPERITONEUM/LYMPH NODES: No lymphadenopathy.  ABDOMINAL WALL: Within normal limits.  BONES: Within normal limits.    IMPRESSION:  Normal appendix.  Dilated distal small bowel with fecalization, suggestive of small bowel   obstruction.. 1 single transition point cannot be identified. Consider   chronic inflammatory ileitis/Crohn's or other causes of ileal stenosis in   the differential diagnosis.  Trace ascites.      --- End of Report ---            ORQUIDEA TANNER MD; Attending Radiologist  This document has been electronically signed. Mar 22 2024  2:45PM (03-22-24 @ 14:09)

## 2024-03-24 LAB
ANION GAP SERPL CALC-SCNC: 7 MMOL/L — SIGNIFICANT CHANGE UP (ref 5–17)
BILIRUB SERPL-MCNC: 0.5 MG/DL — SIGNIFICANT CHANGE UP (ref 0.2–1.2)
BUN SERPL-MCNC: 9 MG/DL — SIGNIFICANT CHANGE UP (ref 7–23)
CALCIUM SERPL-MCNC: 9.2 MG/DL — SIGNIFICANT CHANGE UP (ref 8.4–10.5)
CHLORIDE SERPL-SCNC: 108 MMOL/L — SIGNIFICANT CHANGE UP (ref 96–108)
CO2 SERPL-SCNC: 25 MMOL/L — SIGNIFICANT CHANGE UP (ref 22–31)
CREAT SERPL-MCNC: 0.89 MG/DL — SIGNIFICANT CHANGE UP (ref 0.5–1.3)
EGFR: 94 ML/MIN/1.73M2 — SIGNIFICANT CHANGE UP
GLUCOSE SERPL-MCNC: 77 MG/DL — SIGNIFICANT CHANGE UP (ref 70–99)
HCT VFR BLD CALC: 34.1 % — LOW (ref 34.5–45)
HGB BLD-MCNC: 11.2 G/DL — LOW (ref 11.5–15.5)
INR BLD: 1.05 — SIGNIFICANT CHANGE UP (ref 0.85–1.18)
MAGNESIUM SERPL-MCNC: 2.1 MG/DL — SIGNIFICANT CHANGE UP (ref 1.6–2.6)
MCHC RBC-ENTMCNC: 30.4 PG — SIGNIFICANT CHANGE UP (ref 27–34)
MCHC RBC-ENTMCNC: 32.8 GM/DL — SIGNIFICANT CHANGE UP (ref 32–36)
MCV RBC AUTO: 92.4 FL — SIGNIFICANT CHANGE UP (ref 80–100)
MELD SCORE WITH DIALYSIS: 20 POINTS — SIGNIFICANT CHANGE UP
MELD SCORE WITHOUT DIALYSIS: 7 POINTS — SIGNIFICANT CHANGE UP
NRBC # BLD: 0 /100 WBCS — SIGNIFICANT CHANGE UP (ref 0–0)
PHOSPHATE SERPL-MCNC: 3.4 MG/DL — SIGNIFICANT CHANGE UP (ref 2.5–4.5)
PLATELET # BLD AUTO: 146 K/UL — LOW (ref 150–400)
POTASSIUM SERPL-MCNC: 4.2 MMOL/L — SIGNIFICANT CHANGE UP (ref 3.5–5.3)
POTASSIUM SERPL-SCNC: 4.2 MMOL/L — SIGNIFICANT CHANGE UP (ref 3.5–5.3)
PROTHROM AB SERPL-ACNC: 11.9 SEC — SIGNIFICANT CHANGE UP (ref 9.5–13)
RBC # BLD: 3.69 M/UL — LOW (ref 3.8–5.2)
RBC # FLD: 11.9 % — SIGNIFICANT CHANGE UP (ref 10.3–14.5)
SODIUM SERPL-SCNC: 140 MMOL/L — SIGNIFICANT CHANGE UP (ref 135–145)
WBC # BLD: 3.69 K/UL — LOW (ref 3.8–10.5)
WBC # FLD AUTO: 3.69 K/UL — LOW (ref 3.8–10.5)

## 2024-03-24 PROCEDURE — 43235 EGD DIAGNOSTIC BRUSH WASH: CPT

## 2024-03-24 PROCEDURE — 45378 DIAGNOSTIC COLONOSCOPY: CPT

## 2024-03-24 PROCEDURE — 99232 SBSQ HOSP IP/OBS MODERATE 35: CPT | Mod: 25

## 2024-03-24 PROCEDURE — 99231 SBSQ HOSP IP/OBS SF/LOW 25: CPT

## 2024-03-24 RX ORDER — ACETAMINOPHEN 500 MG
1000 TABLET ORAL ONCE
Refills: 0 | Status: COMPLETED | OUTPATIENT
Start: 2024-03-24 | End: 2024-03-24

## 2024-03-24 RX ORDER — SODIUM CHLORIDE 9 MG/ML
1000 INJECTION, SOLUTION INTRAVENOUS
Refills: 0 | Status: DISCONTINUED | OUTPATIENT
Start: 2024-03-24 | End: 2024-03-25

## 2024-03-24 RX ORDER — SOD SULF/SODIUM/NAHCO3/KCL/PEG
2000 SOLUTION, RECONSTITUTED, ORAL ORAL ONCE
Refills: 0 | Status: COMPLETED | OUTPATIENT
Start: 2024-03-24 | End: 2024-03-24

## 2024-03-24 RX ORDER — DIPHENHYDRAMINE HCL 50 MG
25 CAPSULE ORAL AT BEDTIME
Refills: 0 | Status: DISCONTINUED | OUTPATIENT
Start: 2024-03-24 | End: 2024-03-24

## 2024-03-24 RX ORDER — KETOROLAC TROMETHAMINE 30 MG/ML
15 SYRINGE (ML) INJECTION ONCE
Refills: 0 | Status: DISCONTINUED | OUTPATIENT
Start: 2024-03-24 | End: 2024-03-24

## 2024-03-24 RX ORDER — DIATRIZOATE MEGLUMINE 180 MG/ML
90 INJECTION, SOLUTION INTRAVESICAL ONCE
Refills: 0 | Status: COMPLETED | OUTPATIENT
Start: 2024-03-24 | End: 2024-03-24

## 2024-03-24 RX ORDER — IOHEXOL 300 MG/ML
30 INJECTION, SOLUTION INTRAVENOUS ONCE
Refills: 0 | Status: DISCONTINUED | OUTPATIENT
Start: 2024-03-24 | End: 2024-03-24

## 2024-03-24 RX ORDER — PANTOPRAZOLE SODIUM 20 MG/1
40 TABLET, DELAYED RELEASE ORAL DAILY
Refills: 0 | Status: DISCONTINUED | OUTPATIENT
Start: 2024-03-24 | End: 2024-03-25

## 2024-03-24 RX ADMIN — SODIUM CHLORIDE 125 MILLILITER(S): 9 INJECTION, SOLUTION INTRAVENOUS at 17:33

## 2024-03-24 RX ADMIN — Medication 1000 MILLIGRAM(S): at 17:53

## 2024-03-24 RX ADMIN — SODIUM CHLORIDE 125 MILLILITER(S): 9 INJECTION, SOLUTION INTRAVENOUS at 22:42

## 2024-03-24 RX ADMIN — Medication 15 MILLIGRAM(S): at 12:26

## 2024-03-24 RX ADMIN — DIATRIZOATE MEGLUMINE 90 MILLILITER(S): 180 INJECTION, SOLUTION INTRAVESICAL at 17:16

## 2024-03-24 RX ADMIN — Medication 15 MILLIGRAM(S): at 21:17

## 2024-03-24 RX ADMIN — Medication 5 MILLIGRAM(S): at 23:08

## 2024-03-24 RX ADMIN — Medication 15 MILLIGRAM(S): at 12:19

## 2024-03-24 RX ADMIN — Medication 15 MILLIGRAM(S): at 21:47

## 2024-03-24 RX ADMIN — Medication 650 MILLIGRAM(S): at 10:36

## 2024-03-24 RX ADMIN — Medication 2000 MILLILITER(S): at 06:53

## 2024-03-24 RX ADMIN — PANTOPRAZOLE SODIUM 40 MILLIGRAM(S): 20 TABLET, DELAYED RELEASE ORAL at 17:33

## 2024-03-24 RX ADMIN — Medication 400 MILLIGRAM(S): at 17:48

## 2024-03-24 RX ADMIN — Medication 650 MILLIGRAM(S): at 10:28

## 2024-03-24 NOTE — PROGRESS NOTE ADULT - SUBJECTIVE AND OBJECTIVE BOX
INTERVAL EVENTS: No o/n events. Denies CP, dyspnea, palpitations, presyncope, syncope, f/c/n/v.     REVIEW OF SYSTEMS:  Constitutional:     [X] negative [ ] fevers [ ] chills [ ] weight loss [ ] weight gain  HEENT:                  [X] negative [ ] dry eyes [ ] eye irritation [ ] postnasal drip [ ] nasal congestion  CV:                         [X] negative  [ ] chest pain [ ] orthopnea [ ] palpitations [ ] murmur  Resp:                     [X] negative [ ] cough [ ] shortness of breath [ ] wheezing [ ] sputum [ ] hemoptysis  GI:                          [X] negative [ ] nausea [ ] vomiting [ ] diarrhea [ ] constipation [ ] abd pain [ ] dysphagia   :                        [X] negative [ ] dysuria [ ] nocturia [ ] hematuria [ ] increased urinary frequency  MSK:                      [X] negative [ ] back pain [ ] myalgias [ ] arthralgias [ ] fracture  Skin:                       [X] negative [ ] rash [ ] itch  Neuro:                   [X] negative [ ] headache [ ] dizziness [ ] syncope [ ] weakness [ ] numbness  Psych:                    [X] negative [ ] anxiety [ ] depression  Endo:                     [X] negative [ ] diabetes [ ] thyroid problem  Heme/Lymph:      [X] negative [ ] anemia [ ] bleeding problem  Allergic/Immune: [X] negative [ ] itchy eyes [ ] nasal discharge [ ] hives [ ] angioedema    [X] All other systems negative or otherwise described above.  [ ] Unable to assess ROS due to ________.    PAST MEDICAL & SURGICAL HISTORY:  History of esophagitis    No significant past surgical history      MEDICATIONS  (STANDING):  lactated ringers. 1000 milliLiter(s) (100 mL/Hr) IV Continuous <Continuous>    MEDICATIONS  (PRN):  acetaminophen     Tablet .. 650 milliGRAM(s) Oral every 6 hours PRN Mild Pain (1 - 3), Moderate Pain (4 - 6)  melatonin 5 milliGRAM(s) Oral at bedtime PRN Sleep  ondansetron Injectable 4 milliGRAM(s) IV Push every 4 hours PRN Nausea    ICU Vital Signs Last 24 Hrs  T(C): 37.1 (24 Mar 2024 08:26), Max: 37.1 (24 Mar 2024 08:26)  T(F): 98.7 (24 Mar 2024 08:26), Max: 98.7 (24 Mar 2024 08:26)  HR: 76 (24 Mar 2024 08:26) (76 - 85)  BP: 99/67 (24 Mar 2024 08:26) (94/58 - 109/76)  BP(mean): --  ABP: --  ABP(mean): --  RR: 17 (24 Mar 2024 08:26) (17 - 18)  SpO2: 97% (24 Mar 2024 08:26) (96% - 99%)    O2 Parameters below as of 24 Mar 2024 08:26  Patient On (Oxygen Delivery Method): room air          Orthostatic VS    Daily     Daily   I&O's Summary    23 Mar 2024 07:01  -  24 Mar 2024 07:00  --------------------------------------------------------  IN: 1200 mL / OUT: 1400 mL / NET: -200 mL    24 Mar 2024 07:01  -  24 Mar 2024 10:20  --------------------------------------------------------  IN: 300 mL / OUT: 300 mL / NET: 0 mL        PHYSICAL EXAM:  GENERAL: No acute distress, well-developed  ENT: EOMI, conjunctiva and sclera clear, Neck supple, No JVD, moist mucosa  CHEST/LUNG: Clear to auscultation bilaterally; No wheeze, equal breath sounds bilaterally  HEART: Regular rate and rhythm; No murmurs, rubs, or gallops, radial and DP 2+ b/l, euvolemic  ABDOMEN: Soft, Nontender, Nondistended  EXTREMITIES:  No clubbing, cyanosis, or edema  NEUROLOGY: AAOx3, non-focal  SKIN: Normal color, No rashes or lesions    LABS:                        11.2   3.69  )-----------( 146      ( 24 Mar 2024 05:30 )             34.1     PT/INR - ( 24 Mar 2024 05:30 )   PT: 11.9 sec;   INR: 1.05          PTT - ( 23 Mar 2024 05:30 )  PTT:30.4 sec  03-24    140  |  108  |  9   ----------------------------<  77  4.2   |  25  |  0.89    Ca    9.2      24 Mar 2024 05:30  Phos  3.4     03-24  Mg     2.1     03-24    TPro  x   /  Alb  x   /  TBili  0.5  /  DBili  x   /  AST  x   /  ALT  x   /  AlkPhos  x   03-24      Urinalysis Basic - ( 24 Mar 2024 05:30 )    Color: x / Appearance: x / SG: x / pH: x  Gluc: 77 mg/dL / Ketone: x  / Bili: x / Urobili: x   Blood: x / Protein: x / Nitrite: x   Leuk Esterase: x / RBC: x / WBC x   Sq Epi: x / Non Sq Epi: x / Bacteria: x        Culture - Urine (collected 22 Mar 2024 12:32)  Source: Clean Catch Clean Catch (Midstream)  Final Report (23 Mar 2024 14:53):    <10,000 CFU/mL Normal Urogenital Lynn        RADIOLOGY & ADDITIONAL STUDIES:    Reviewed

## 2024-03-24 NOTE — PROGRESS NOTE ADULT - SUBJECTIVE AND OBJECTIVE BOX
Overnight events: No acute overnight events.           SUBJECTIVE: Patient seen at bedside with chief resident.       MEDICATIONS  (STANDING):  lactated ringers. 1000 milliLiter(s) (100 mL/Hr) IV Continuous <Continuous>    MEDICATIONS  (PRN):  acetaminophen     Tablet .. 650 milliGRAM(s) Oral every 6 hours PRN Mild Pain (1 - 3), Moderate Pain (4 - 6)  melatonin 5 milliGRAM(s) Oral at bedtime PRN Sleep  ondansetron Injectable 4 milliGRAM(s) IV Push every 4 hours PRN Nausea      Vital Signs Last 24 Hrs  T(C): 37.1 (24 Mar 2024 08:26), Max: 37.1 (24 Mar 2024 08:26)  T(F): 98.7 (24 Mar 2024 08:26), Max: 98.7 (24 Mar 2024 08:26)  HR: 76 (24 Mar 2024 08:26) (76 - 85)  BP: 99/67 (24 Mar 2024 08:26) (94/58 - 109/76)  BP(mean): 71 (23 Mar 2024 09:34) (71 - 71)  RR: 17 (24 Mar 2024 08:26) (17 - 18)  SpO2: 97% (24 Mar 2024 08:26) (96% - 99%)    Parameters below as of 24 Mar 2024 08:26  Patient On (Oxygen Delivery Method): room air        Physical Exam:  General: NAD, resting comfortably in bed  Pulmonary: Nonlabored breathing, no respiratory distress  Cardiovascular: NSR  Abdominal: soft, ttp epigastric and b/l lower quadrants   Extremities: WWP, normal strength  Neuro: A/O x 3, CNs II-XII grossly intact,    I&O's Summary    23 Mar 2024 07:01  -  24 Mar 2024 07:00  --------------------------------------------------------  IN: 1200 mL / OUT: 1400 mL / NET: -200 mL    24 Mar 2024 07:01  -  24 Mar 2024 09:09  --------------------------------------------------------  IN: 200 mL / OUT: 0 mL / NET: 200 mL        LABS:                        11.2   3.69  )-----------( 146      ( 24 Mar 2024 05:30 )             34.1     03-24    140  |  108  |  9   ----------------------------<  77  4.2   |  25  |  0.89    Ca    9.2      24 Mar 2024 05:30  Phos  3.4     03-24  Mg     2.1     03-24    TPro  x   /  Alb  x   /  TBili  0.5  /  DBili  x   /  AST  x   /  ALT  x   /  AlkPhos  x   03-24    PT/INR - ( 24 Mar 2024 05:30 )   PT: 11.9 sec;   INR: 1.05          PTT - ( 23 Mar 2024 05:30 )  PTT:30.4 sec  Urinalysis Basic - ( 24 Mar 2024 05:30 )    Color: x / Appearance: x / SG: x / pH: x  Gluc: 77 mg/dL / Ketone: x  / Bili: x / Urobili: x   Blood: x / Protein: x / Nitrite: x   Leuk Esterase: x / RBC: x / WBC x   Sq Epi: x / Non Sq Epi: x / Bacteria: x      CAPILLARY BLOOD GLUCOSE        LIVER FUNCTIONS - ( 22 Mar 2024 12:32 )  Alb: 4.2 g/dL / Pro: 7.6 g/dL / ALK PHOS: 58 U/L / ALT: 14 U/L / AST: 19 U/L / GGT: x             RADIOLOGY & ADDITIONAL STUDIES:

## 2024-03-24 NOTE — CHART NOTE - NSCHARTNOTEFT_GEN_A_CORE
Pt drank ~2L Golytely but c/o similar band-like epigastric pain shortly after drinking any liquids, not relieved by Tylenol.    Recommendations:  - Continue bowel prep w/ Golytely until BMs clear  - NPO at midnight for colo tomorrow  - If current pain control is inadequate, would be OK from GI perspective for pt to receive low-dose opioids if needed     Kenyon (Lexi) MD [facility]  Gastroenterology Fellow  Pager (M-F 7a-5p): [phone]  Pager (after hours): Please call  for on-call fellow

## 2024-03-25 VITALS
RESPIRATION RATE: 17 BRPM | SYSTOLIC BLOOD PRESSURE: 100 MMHG | OXYGEN SATURATION: 96 % | TEMPERATURE: 98 F | HEART RATE: 81 BPM | DIASTOLIC BLOOD PRESSURE: 66 MMHG

## 2024-03-25 DIAGNOSIS — K56.609 UNSPECIFIED INTESTINAL OBSTRUCTION, UNSPECIFIED AS TO PARTIAL VERSUS COMPLETE OBSTRUCTION: ICD-10-CM

## 2024-03-25 DIAGNOSIS — Z29.9 ENCOUNTER FOR PROPHYLACTIC MEASURES, UNSPECIFIED: ICD-10-CM

## 2024-03-25 DIAGNOSIS — D69.6 THROMBOCYTOPENIA, UNSPECIFIED: ICD-10-CM

## 2024-03-25 DIAGNOSIS — D72.819 DECREASED WHITE BLOOD CELL COUNT, UNSPECIFIED: ICD-10-CM

## 2024-03-25 LAB
ANION GAP SERPL CALC-SCNC: 8 MMOL/L — SIGNIFICANT CHANGE UP (ref 5–17)
BUN SERPL-MCNC: 3 MG/DL — LOW (ref 7–23)
CALCIUM SERPL-MCNC: 9.6 MG/DL — SIGNIFICANT CHANGE UP (ref 8.4–10.5)
CHLORIDE SERPL-SCNC: 105 MMOL/L — SIGNIFICANT CHANGE UP (ref 96–108)
CO2 SERPL-SCNC: 25 MMOL/L — SIGNIFICANT CHANGE UP (ref 22–31)
CREAT SERPL-MCNC: 0.7 MG/DL — SIGNIFICANT CHANGE UP (ref 0.5–1.3)
EGFR: 125 ML/MIN/1.73M2 — SIGNIFICANT CHANGE UP
GLUCOSE SERPL-MCNC: 84 MG/DL — SIGNIFICANT CHANGE UP (ref 70–99)
HCT VFR BLD CALC: 37.7 % — SIGNIFICANT CHANGE UP (ref 34.5–45)
HGB BLD-MCNC: 12.4 G/DL — SIGNIFICANT CHANGE UP (ref 11.5–15.5)
MAGNESIUM SERPL-MCNC: 1.8 MG/DL — SIGNIFICANT CHANGE UP (ref 1.6–2.6)
MCHC RBC-ENTMCNC: 30.8 PG — SIGNIFICANT CHANGE UP (ref 27–34)
MCHC RBC-ENTMCNC: 32.9 GM/DL — SIGNIFICANT CHANGE UP (ref 32–36)
MCV RBC AUTO: 93.5 FL — SIGNIFICANT CHANGE UP (ref 80–100)
NRBC # BLD: 0 /100 WBCS — SIGNIFICANT CHANGE UP (ref 0–0)
PHOSPHATE SERPL-MCNC: 3 MG/DL — SIGNIFICANT CHANGE UP (ref 2.5–4.5)
PLATELET # BLD AUTO: 176 K/UL — SIGNIFICANT CHANGE UP (ref 150–400)
POTASSIUM SERPL-MCNC: 3.8 MMOL/L — SIGNIFICANT CHANGE UP (ref 3.5–5.3)
POTASSIUM SERPL-SCNC: 3.8 MMOL/L — SIGNIFICANT CHANGE UP (ref 3.5–5.3)
RBC # BLD: 4.03 M/UL — SIGNIFICANT CHANGE UP (ref 3.8–5.2)
RBC # FLD: 11.6 % — SIGNIFICANT CHANGE UP (ref 10.3–14.5)
SODIUM SERPL-SCNC: 138 MMOL/L — SIGNIFICANT CHANGE UP (ref 135–145)
WBC # BLD: 3.32 K/UL — LOW (ref 3.8–10.5)
WBC # FLD AUTO: 3.32 K/UL — LOW (ref 3.8–10.5)

## 2024-03-25 PROCEDURE — 83605 ASSAY OF LACTIC ACID: CPT

## 2024-03-25 PROCEDURE — 81003 URINALYSIS AUTO W/O SCOPE: CPT

## 2024-03-25 PROCEDURE — 80053 COMPREHEN METABOLIC PANEL: CPT

## 2024-03-25 PROCEDURE — 86900 BLOOD TYPING SEROLOGIC ABO: CPT

## 2024-03-25 PROCEDURE — 83735 ASSAY OF MAGNESIUM: CPT

## 2024-03-25 PROCEDURE — 84484 ASSAY OF TROPONIN QUANT: CPT

## 2024-03-25 PROCEDURE — 84100 ASSAY OF PHOSPHORUS: CPT

## 2024-03-25 PROCEDURE — 84702 CHORIONIC GONADOTROPIN TEST: CPT

## 2024-03-25 PROCEDURE — 96374 THER/PROPH/DIAG INJ IV PUSH: CPT

## 2024-03-25 PROCEDURE — 36415 COLL VENOUS BLD VENIPUNCTURE: CPT

## 2024-03-25 PROCEDURE — 74177 CT ABD & PELVIS W/CONTRAST: CPT | Mod: 26

## 2024-03-25 PROCEDURE — 87086 URINE CULTURE/COLONY COUNT: CPT

## 2024-03-25 PROCEDURE — 83993 ASSAY FOR CALPROTECTIN FECAL: CPT

## 2024-03-25 PROCEDURE — 80048 BASIC METABOLIC PNL TOTAL CA: CPT

## 2024-03-25 PROCEDURE — 87798 DETECT AGENT NOS DNA AMP: CPT

## 2024-03-25 PROCEDURE — 85730 THROMBOPLASTIN TIME PARTIAL: CPT

## 2024-03-25 PROCEDURE — 85610 PROTHROMBIN TIME: CPT

## 2024-03-25 PROCEDURE — 74177 CT ABD & PELVIS W/CONTRAST: CPT | Mod: MC

## 2024-03-25 PROCEDURE — 85652 RBC SED RATE AUTOMATED: CPT

## 2024-03-25 PROCEDURE — 96375 TX/PRO/DX INJ NEW DRUG ADDON: CPT

## 2024-03-25 PROCEDURE — 83690 ASSAY OF LIPASE: CPT

## 2024-03-25 PROCEDURE — 99232 SBSQ HOSP IP/OBS MODERATE 35: CPT

## 2024-03-25 PROCEDURE — 86901 BLOOD TYPING SEROLOGIC RH(D): CPT

## 2024-03-25 PROCEDURE — 86850 RBC ANTIBODY SCREEN: CPT

## 2024-03-25 PROCEDURE — 87507 IADNA-DNA/RNA PROBE TQ 12-25: CPT

## 2024-03-25 PROCEDURE — 85025 COMPLETE CBC W/AUTO DIFF WBC: CPT

## 2024-03-25 PROCEDURE — 86140 C-REACTIVE PROTEIN: CPT

## 2024-03-25 PROCEDURE — 85027 COMPLETE CBC AUTOMATED: CPT

## 2024-03-25 PROCEDURE — 99285 EMERGENCY DEPT VISIT HI MDM: CPT

## 2024-03-25 RX ORDER — PANTOPRAZOLE SODIUM 20 MG/1
1 TABLET, DELAYED RELEASE ORAL
Qty: 56 | Refills: 0
Start: 2024-03-25 | End: 2024-05-19

## 2024-03-25 RX ADMIN — Medication 400 MILLIGRAM(S): at 00:06

## 2024-03-25 RX ADMIN — Medication 1000 MILLIGRAM(S): at 00:36

## 2024-03-25 NOTE — DISCHARGE NOTE PROVIDER - HOSPITAL COURSE
23y F with PMHx of esophagitis (diagnosed on EGD in 2020) and episodic bloody BMs (no medical workup) and no PSHx presented to [facility] with 1d abdominal pain and nausea. CT A/P findings concerning for SBO vs inflammatory ileitis vs Crohn's vs gastritis. Clinical exam and continued bowel function makes SBO less likely diagnosis. Patient admitted to [facility] fur further workup. Plan for Cscope w. GI 3/25. CT A/P showed ____        INCOMPLETE 03/25***** 23y F with PMHx of esophagitis (diagnosed on EGD in 2020) and episodic bloody BMs (no medical workup) and no PSHx presented to [facility] with 1d abdominal pain and nausea. CT A/P findings concerning for SBO vs inflammatory ileitis vs Crohn's vs gastritis. Clinical exam and continued bowel function makes SBO less likely diagnosis. Patient admitted to [facility] fur further workup. CT A/P was repeated during your hospital admission, No imaging signs of bowel inflammation. No evidence of stricture or obstruction, resolved findings seen on prior study (3/24). Patient underwent an EGD and c-scope, results significant for Grade/Stage I internal hemorrhoids, the colonic mucosa was otherwise unremarkable (3/25/2024). GI recommends to continue a course of Protonix for 8 weeks. Please followup with Dr. Stinson.

## 2024-03-25 NOTE — DISCHARGE NOTE PROVIDER - NSDCFUADDINST_GEN_ALL_CORE_FT
General Discharge Instructions:  Please begin Protonix 40mg once a day, for 8 weeks.   For pain, you may alternate between Tylenol for the max of 4000mg daily and Motrin.     Warning Signs:  Please call your doctor if you experience the following:  *If you are vomiting and cannot keep down fluids or your medications.  *You see blood or dark/black material when you vomit or have a bowel movement.  *You experience burning when you urinate, have blood in your urine, or experience a discharge.  *Your pain is not improving within 8-12 hours or is not gone within 24 hours. Call or return immediately if your pain is getting worse, changes location, or moves to your chest or back.  *You have shaking chills, or fever greater than 101.5 degrees Fahrenheit or 38 degrees Celsius.  *Any change in your symptoms, or any new symptoms that concern you.

## 2024-03-25 NOTE — PROGRESS NOTE ADULT - SUBJECTIVE AND OBJECTIVE BOX
Patient is a 23y old  Female who presents with a chief complaint of Abdominal Pain (25 Mar 2024 08:42)      INTERVAL HPI/OVERNIGHT EVENTS: Patient seen after she returned from colonoscopy today. Denies abdominal pain, N/V, hematemesis, melena and hematochezia.     MEDICATIONS  (STANDING):  pantoprazole  Injectable 40 milliGRAM(s) IV Push daily    MEDICATIONS  (PRN):  melatonin 5 milliGRAM(s) Oral at bedtime PRN Sleep  ondansetron Injectable 4 milliGRAM(s) IV Push every 4 hours PRN Nausea      __________________________________________________      Vital Signs Last 24 Hrs  T(C): 36.9 (25 Mar 2024 14:38), Max: 37 (24 Mar 2024 22:29)  T(F): 98.5 (25 Mar 2024 14:38), Max: 98.6 (24 Mar 2024 22:29)  HR: 67 (25 Mar 2024 14:38) (67 - 81)  BP: 100/70 (25 Mar 2024 14:38) (100/66 - 117/79)  BP(mean): 71 (25 Mar 2024 09:49) (71 - 71)  RR: 18 (25 Mar 2024 14:38) (16 - 18)  SpO2: 97% (25 Mar 2024 14:38) (97% - 99%)    Parameters below as of 25 Mar 2024 14:38  Patient On (Oxygen Delivery Method): room air        ________________________________________________  PHYSICAL EXAM:  GENERAL: NAD  CHEST/LUNG: Clear to auscultation bilaterally with good air entry   HEART: S1 S2  regular; no murmurs, gallops or rubs  ABDOMEN: Soft, Nontender, Nondistended; Bowel sounds present  EXTREMITIES: no cyanosis; no edema; no calf tenderness  SKIN: warm and dry; no rash  NERVOUS SYSTEM:  no new deficits    _________________________________________________  LABS:                        11.2   3.69  )-----------( 146      ( 24 Mar 2024 05:30 )             34.1     03-24    140  |  108  |  9   ----------------------------<  77  4.2   |  25  |  0.89    Ca    9.2      24 Mar 2024 05:30  Phos  3.4     03-24  Mg     2.1     03-24    TPro  x   /  Alb  x   /  TBili  0.5  /  DBili  x   /  AST  x   /  ALT  x   /  AlkPhos  x   03-24    PT/INR - ( 24 Mar 2024 05:30 )   PT: 11.9 sec;   INR: 1.05            Urinalysis Basic - ( 24 Mar 2024 05:30 )    Color: x / Appearance: x / SG: x / pH: x  Gluc: 77 mg/dL / Ketone: x  / Bili: x / Urobili: x   Blood: x / Protein: x / Nitrite: x   Leuk Esterase: x / RBC: x / WBC x   Sq Epi: x / Non Sq Epi: x / Bacteria: x      CAPILLARY BLOOD GLUCOSE            RADIOLOGY & ADDITIONAL TESTS:      Plan of care was discussed with patient and /or primary care giver; all questions and concerns were addressed and care was aligned with patient's wishes.

## 2024-03-25 NOTE — PROGRESS NOTE ADULT - ASSESSMENT
23y F with PMHx of esophagitis (diagnosed on EGD in 2020) and episodic bloody BMs (no medical workup) and no PSHx presented to [facility] with 1d abdominal pain and nausea. CT A/P findings concerning for SBO vs inflammatory ileitis vs Crohn's. Clinical exam and continued bowel function makes SBO less likely diagnosis. Patient admitted to [facility] fur further workup. GI scope Monday    CLD/IVF  Pain and nausea prn  SCDs/OOB  AM labs  GI consult  Will continue to drink golytely 
23y F with PMHx of esophagitis (diagnosed on EGD in 2020) and episodic bloody BMs (no medical workup) and no PSHx presented to [facility] with 1d abdominal pain and nausea. CT A/P findings concerning for SBO vs inflammatory ileitis vs Crohn's. Clinical exam and continued bowel function makes SBO less likely diagnosis. Patient admitted to [facility] fur further workup. Plan for Cscope w. GI 3/25    NPO/IVF  Pain and nausea prn  SCDs/OOB  AM labs  GI recs  CT final read  
23y F with PMHx of esophagitis (diagnosed on EGD in 2020) and episodic bloody BMs (no medical workup) and no PSHx presented to [facility] with 1d abdominal pain and nausea in the setting of  small bowel obstruction vs chronic inflammatory ileitis/Crohn's or other causes of ileal stenosis. Patient was admitted to [facility] surgery for further workup of possible SBO vs IBD.     Plan  #r/o SBO  -ab pain significantly improved  -GI consulted, NPO at [facility] for c-scope Monday  -f/u fecal calprotectin, HBV, quantiferon  -CLD, advance as tolerated  -mgmt as per primary    #Thrombocytopenia; leukopenia  -no h/o bleeding d/o; likely dilutional  -c/w CBC qd    #DVT ppx  -SCDs 
23y F with PMHx of esophagitis (diagnosed on EGD in 2020) and episodic bloody BMs (no medical workup) and no PSHx presented to [facility] with 1d abdominal pain and nausea. CT A/P findings concerning for SBO vs inflammatory ileitis vs Crohn's. Clinical exam and continued bowel function makes SBO less likely diagnosis. Patient admitted to [facility] fur further workup.    NPO/IVF  Pain and nausea prn  SCDs/OOB  AM labs  GI consult  
23y F with PMHx of esophagitis (diagnosed on EGD in 2020) and episodic bloody BMs (no medical workup) and no PSHx presented to [facility] with 1d abdominal pain and nausea in the setting of  small bowel obstruction vs chronic inflammatory ileitis/Crohn's or other causes of ileal stenosis. Patient was admitted to [facility] surgery for further workup of possible SBO vs IBD.     Plan  #r/o SBO  -ab pain significantly improved  -GI consulted, NPO at [facility] for c-scope Monday  -f/u fecal calprotectin, HBV, quantiferon  -CLD, advance as tolerated  -mgmt as per primary    #Thrombocytopenia; leukopenia  -no h/o bleeding d/o; likely dilutional  -c/w CBC qd    #DVT ppx  -SCDs

## 2024-03-25 NOTE — PROGRESS NOTE ADULT - ATTENDING COMMENTS
Work up is negative for colitis, abdomen soft, BS+, Flatus+, BM+, tolerating diet, D/C home with F/U in the office.
Needs CT with PO and IV contrast, GI to scope, F/U LABS, VS, abdominal examinations

## 2024-03-25 NOTE — PROGRESS NOTE ADULT - PROBLEM SELECTOR PLAN 1
CT 3/22: Dilated distal small bowel with fecalization, suggestive of small bowel obstruction. 1 single transition point cannot be identified. Consider chronic inflammatory ileitis/Crohn's or other causes of ileal stenosis in the differential diagnosis.  CT 2/24: No imaging signs of bowel inflammation. No evidence of stricture or obstruction, resolved findings seen on prior study.  s/p EGD/Colonoscopy: Grade/Stage I internal hemorrhoids. The colonic mucosa was otherwise unremarkable. normal esophagus, stomach and duodenum  GI PCR wnl   Management as per GI and Surgery

## 2024-03-25 NOTE — PROGRESS NOTE ADULT - SUBJECTIVE AND OBJECTIVE BOX
O/N: NAEO, CT A/P performed, bowel prep completed; wet read CT A/P thickened piece of ileum, no SBO w/ contrast in colon, and no obvious signs of IBD     SUBJECTIVE: Patient seen and examined bedside by chief resident. Finished bowel prep Overnight, no other acute complaints. Denies sob/dizziness/cp        Vital Signs Last 24 Hrs  T(C): 36.9 (25 Mar 2024 05:38), Max: 37.2 (24 Mar 2024 14:41)  T(F): 98.5 (25 Mar 2024 05:38), Max: 98.9 (24 Mar 2024 14:41)  HR: 72 (25 Mar 2024 05:38) (69 - 81)  BP: 105/69 (25 Mar 2024 05:38) (100/66 - 117/79)  BP(mean): --  RR: 17 (25 Mar 2024 05:38) (16 - 17)  SpO2: 98% (25 Mar 2024 05:38) (97% - 99%)    Parameters below as of 25 Mar 2024 05:38  Patient On (Oxygen Delivery Method): room air      I&O's Detail    24 Mar 2024 07:01  -  25 Mar 2024 07:00  --------------------------------------------------------  IN:    dextrose 5% + lactated ringers: 1625 mL    IV PiggyBack: 100 mL    Lactated Ringers: 1100 mL    Oral Fluid: 480 mL  Total IN: 3305 mL    OUT:    Voided (mL): 1700 mL  Total OUT: 1700 mL    Total NET: 1605 mL          General: NAD, resting comfortably in bed  C/V: NSR  Pulm: Nonlabored breathing, no respiratory distress  Abd: soft,  mildly ttp RUQ. /ND. No rebound or guarding  Extrem: WWP, no edema, SCDs in place        LABS:                        11.2   3.69  )-----------( 146      ( 24 Mar 2024 05:30 )             34.1     03-24    140  |  108  |  9   ----------------------------<  77  4.2   |  25  |  0.89    Ca    9.2      24 Mar 2024 05:30  Phos  3.4     03-24  Mg     2.1     03-24    TPro  x   /  Alb  x   /  TBili  0.5  /  DBili  x   /  AST  x   /  ALT  x   /  AlkPhos  x   03-24    PT/INR - ( 24 Mar 2024 05:30 )   PT: 11.9 sec;   INR: 1.05            Urinalysis Basic - ( 24 Mar 2024 05:30 )    Color: x / Appearance: x / SG: x / pH: x  Gluc: 77 mg/dL / Ketone: x  / Bili: x / Urobili: x   Blood: x / Protein: x / Nitrite: x   Leuk Esterase: x / RBC: x / WBC x   Sq Epi: x / Non Sq Epi: x / Bacteria: x        RADIOLOGY & ADDITIONAL STUDIES:

## 2024-03-25 NOTE — DISCHARGE NOTE PROVIDER - INSTRUCTIONS
Please continue a liquid diet, with protein. "MUSH" diet, smoothies and protein shakes until regular gas and bowel movements.

## 2024-03-25 NOTE — DISCHARGE NOTE PROVIDER - NSDCCPTREATMENT_GEN_ALL_CORE_FT
PRINCIPAL PROCEDURE  Procedure: Colonoscopy  Findings and Treatment: SIgnificant for hemorrhoids      SECONDARY PROCEDURE  Procedure: EGD  Findings and Treatment: Normal findings (3/25)

## 2024-03-25 NOTE — CHART NOTE - NSCHARTNOTEFT_GEN_A_CORE
EGD/Colonoscopy performed in endoscopy suite with Dr Ramachandran and myself, findings as noted below:    Impression:    EGD:  Normal esophagus.  Normal stomach.  Normal duodenum    Colonoscopy:  Grade/Stage I internal hemorrhoids.  The colonic mucosa was otherwise unremarkable.    Plan:  Resume Previous Diet  Protonix 40 mg po daily x 8 weeks  Return to floor for further management

## 2024-03-25 NOTE — DISCHARGE NOTE PROVIDER - NSDCCPCAREPLAN_GEN_ALL_CORE_FT
PRINCIPAL DISCHARGE DIAGNOSIS  Diagnosis: Abdominal pain  Assessment and Plan of Treatment: CT A/P repeated, showing no stricture or obstruction.   EGD / Cscope significant for hemorroids.

## 2024-03-25 NOTE — DISCHARGE NOTE PROVIDER - CARE PROVIDER_API CALL
Juanita Stinson   Surgery  [address], [address]  [place] [address]  Phone: [phone]  Fax: [phone]  Follow Up Time: 2 weeks

## 2024-03-25 NOTE — DISCHARGE NOTE NURSING/CASE MANAGEMENT/SOCIAL WORK - PATIENT PORTAL LINK FT
You can access the FollowMyHealth Patient Portal offered by [facility] by registering at the following website: [email or web address]. By joining [employer]s FollowMyHealth portal, you will also be able to view your health information using other applications (apps) compatible with our [email or web address]

## 2024-03-27 LAB — CALPROTECTIN STL-MCNT: 12 UG/G — SIGNIFICANT CHANGE UP (ref 0–120)

## 2024-03-28 LAB
NOROVIRUS GI+II RNA STL QL NAA+NON-PROBE: SIGNIFICANT CHANGE UP
NOROVIRUS GI+II RNA STL QL NAA+NON-PROBE: SIGNIFICANT CHANGE UP

## 2024-04-04 DIAGNOSIS — R10.9 UNSPECIFIED ABDOMINAL PAIN: ICD-10-CM

## 2024-04-04 DIAGNOSIS — D69.6 THROMBOCYTOPENIA, UNSPECIFIED: ICD-10-CM

## 2024-04-04 DIAGNOSIS — R18.8 OTHER ASCITES: ICD-10-CM

## 2024-04-04 DIAGNOSIS — Z97.5 PRESENCE OF (INTRAUTERINE) CONTRACEPTIVE DEVICE: ICD-10-CM

## 2024-04-04 DIAGNOSIS — Z88.0 ALLERGY STATUS TO PENICILLIN: ICD-10-CM

## 2024-04-04 DIAGNOSIS — Z88.2 ALLERGY STATUS TO SULFONAMIDES: ICD-10-CM

## 2024-04-04 DIAGNOSIS — K64.0 FIRST DEGREE HEMORRHOIDS: ICD-10-CM

## 2024-09-25 ENCOUNTER — APPOINTMENT (OUTPATIENT)
Dept: HEMATOLOGY ONCOLOGY | Facility: CLINIC | Age: 24
End: 2024-09-25